# Patient Record
Sex: MALE | Race: OTHER
[De-identification: names, ages, dates, MRNs, and addresses within clinical notes are randomized per-mention and may not be internally consistent; named-entity substitution may affect disease eponyms.]

---

## 2019-10-18 NOTE — NUR
PT MARVINSELGEORGE C/O PT STATES,"FEELING POISONED. NEIGHBORS HINTED PT WILL BE IN A 
WHEELCHAIR IN A MONTH." PT AOX4 RR EVEN AND UNLABORED. NO SOB NOTED. NO NVD AT 
THIS TIME. NO ACUTE DISTRESS NOTED. URINE COLLECTED AND SENT TO LAB

## 2019-10-20 NOTE — NUR
-------------------------------------------------------------------------------

           *** Note undone in ED - 10/20/19 at 1025 by MALGORZATA ***           

-------------------------------------------------------------------------------

Patient discharged to home in stable condition. Written and verbal after care 
instructions given. Patient verbalizes understanding of instruction.

## 2019-10-20 NOTE — NUR
PT. VERBALIZED UNDERSTANDING OF AFTERCARE INSTRUCTIONS. Patient discharged to 
home in stable condition. Written and verbal after care instructions given to 
patient and family at bedside. Patient and family verbalizes understanding of 
instruction.

## 2019-10-20 NOTE — NUR
MANDI FROM HOME TO ER BED 7. AAOX4. MANIC, PARANOID AND ANXIOUS. AMBULATORY. 
NO RESP DISTRESS NOTED. C/O LEAD POISONING. PT REPORTS THAT HE GOT AN ANONYMOUS 
TEXT THAT HE HAS LEAD POISONED THRU HIS FOOD. PT REPORTS SYMPTOMS 
SLEEPLESSNESS, LETHARGY  AND POOR APPETIE. MD WAS AT BEDSIDE FOR EVAL. ORDERS 
RECEIVED NOTED AND CARRIED OUT.

## 2019-11-14 ENCOUNTER — HOSPITAL ENCOUNTER (EMERGENCY)
Dept: HOSPITAL 54 - ER | Age: 29
LOS: 1 days | Discharge: HOME | End: 2019-11-15
Payer: COMMERCIAL

## 2019-11-14 VITALS — HEIGHT: 72 IN | WEIGHT: 230 LBS | BODY MASS INDEX: 31.15 KG/M2

## 2019-11-14 DIAGNOSIS — F22: Primary | ICD-10-CM

## 2019-11-14 DIAGNOSIS — R45.851: ICD-10-CM
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
## 2019-11-14 DIAGNOSIS — F10.10: ICD-10-CM

## 2019-11-14 LAB
BASOPHILS # BLD AUTO: 0 /CMM (ref 0–0.2)
BASOPHILS NFR BLD AUTO: 0.3 % (ref 0–2)
EOSINOPHIL NFR BLD AUTO: 1.2 % (ref 0–6)
GLUCOSE UR STRIP-MCNC: (no result) MG/DL
HCT VFR BLD AUTO: 44 % (ref 39–51)
HGB BLD-MCNC: 14.7 G/DL (ref 13.5–17.5)
LYMPHOCYTES NFR BLD AUTO: 1.5 /CMM (ref 0.8–4.8)
LYMPHOCYTES NFR BLD AUTO: 22.2 % (ref 20–44)
MCHC RBC AUTO-ENTMCNC: 34 G/DL (ref 31–36)
MCV RBC AUTO: 94 FL (ref 80–96)
MONOCYTES NFR BLD AUTO: 0.6 /CMM (ref 0.1–1.3)
MONOCYTES NFR BLD AUTO: 8.9 % (ref 2–12)
NEUTROPHILS # BLD AUTO: 4.5 /CMM (ref 1.8–8.9)
NEUTROPHILS NFR BLD AUTO: 67.4 % (ref 43–81)
PH UR STRIP: 6 [PH] (ref 5–8)
PLATELET # BLD AUTO: 284 /CMM (ref 150–450)
RBC # BLD AUTO: 4.65 MIL/UL (ref 4.5–6)
UROBILINOGEN UR STRIP-MCNC: 0.2 EU/DL
WBC NRBC COR # BLD AUTO: 6.7 K/UL (ref 4.3–11)

## 2019-11-14 PROCEDURE — G0480 DRUG TEST DEF 1-7 CLASSES: HCPCS

## 2019-11-14 PROCEDURE — 80329 ANALGESICS NON-OPIOID 1 OR 2: CPT

## 2019-11-14 PROCEDURE — 81001 URINALYSIS AUTO W/SCOPE: CPT

## 2019-11-14 PROCEDURE — 80048 BASIC METABOLIC PNL TOTAL CA: CPT

## 2019-11-14 PROCEDURE — 99285 EMERGENCY DEPT VISIT HI MDM: CPT

## 2019-11-14 PROCEDURE — 80305 DRUG TEST PRSMV DIR OPT OBS: CPT

## 2019-11-14 PROCEDURE — 80076 HEPATIC FUNCTION PANEL: CPT

## 2019-11-14 PROCEDURE — 85025 COMPLETE CBC W/AUTO DIFF WBC: CPT

## 2019-11-14 PROCEDURE — 36415 COLL VENOUS BLD VENIPUNCTURE: CPT

## 2019-11-14 PROCEDURE — 80307 DRUG TEST PRSMV CHEM ANLYZR: CPT

## 2019-11-14 NOTE — NUR
PT AAOX4. AMBULATORY C/O "I THINK I HAVE MERCURY POISONING AND THE GOVERNMENT 
IS DOING IT TO ME" +SI, -HI, -PLAN. PER PATIENT "I HAVE NOT SLEPT IN A COUPLE 
OF DAYS, I FEEL LIKE CRAP." NO ACUTE DISTRESS NOTED. MD AT BEDSIDE FOR EVAL.

## 2019-11-15 VITALS — SYSTOLIC BLOOD PRESSURE: 132 MMHG | DIASTOLIC BLOOD PRESSURE: 87 MMHG

## 2019-11-15 LAB
ALBUMIN SERPL BCP-MCNC: 3.9 G/DL (ref 3.4–5)
ALP SERPL-CCNC: 68 U/L (ref 46–116)
ALT SERPL W P-5'-P-CCNC: 80 U/L (ref 12–78)
APAP SERPL-MCNC: < 2 UG/ML (ref 10–30)
AST SERPL W P-5'-P-CCNC: 31 U/L (ref 15–37)
BILIRUB DIRECT SERPL-MCNC: 0.1 MG/DL (ref 0–0.2)
BILIRUB SERPL-MCNC: 0.5 MG/DL (ref 0.2–1)
BUN SERPL-MCNC: 17 MG/DL (ref 7–18)
CALCIUM SERPL-MCNC: 8.6 MG/DL (ref 8.5–10.1)
CHLORIDE SERPL-SCNC: 104 MMOL/L (ref 98–107)
CO2 SERPL-SCNC: 32 MMOL/L (ref 21–32)
CREAT SERPL-MCNC: 1.1 MG/DL (ref 0.6–1.3)
ETHANOL SERPL-MCNC: < 3 MG/DL (ref 0–0)
GLUCOSE SERPL-MCNC: 150 MG/DL (ref 74–106)
POTASSIUM SERPL-SCNC: 3.7 MMOL/L (ref 3.5–5.1)
PROT SERPL-MCNC: 6.7 G/DL (ref 6.4–8.2)
SALICYLATES SERPL-MCNC: 1.2 MG/DL (ref 2.8–20)
SODIUM SERPL-SCNC: 139 MMOL/L (ref 136–145)

## 2019-11-15 NOTE — NUR
report given to nurse winter at [facility]. pt going to room 601.A but 
needs to drop by admitting

## 2019-11-15 NOTE — NUR
pt no longer wishing to go to [facility]. pt denies si/hi. pt denies any 
medical compaint. Patient discharged to home in stable condition. Written and 
verbal after care instructions given. Patient verbalizes understanding of 
instruction.Patient is awake and alert to self, day, and place. pt ambulatory 
with a steady gait

## 2019-11-15 NOTE — NUR
TINA contacted Saeid [phone] at [facility] to follow up regarding pt's acceptance at [facility]. Pt has been in the ED for over 17 + hours waiting for a bed at [facility] 
location. Saeid informed TINA, he will contact them right away. TINA also contacted [facility] 
[phone] and spoke with Lucina who informed TINA that they are aware of pt needing 
acceptance at [facility], however there has been a delay in pending discharges at [facility] 
which is delaying the process. Lucina informed TINA she will contact [facility] ED as soon as she 
speaks with the nursing supervisor at [facility]. TINA updated RN John in ED and informed 
him to call [facility] within an hour for an update.